# Patient Record
Sex: MALE | Race: WHITE | Employment: OTHER | URBAN - METROPOLITAN AREA
[De-identification: names, ages, dates, MRNs, and addresses within clinical notes are randomized per-mention and may not be internally consistent; named-entity substitution may affect disease eponyms.]

---

## 2017-02-06 ENCOUNTER — OFFICE VISIT (OUTPATIENT)
Dept: INTERNAL MEDICINE CLINIC | Age: 62
End: 2017-02-06

## 2017-02-06 VITALS
HEART RATE: 82 BPM | BODY MASS INDEX: 25.84 KG/M2 | DIASTOLIC BLOOD PRESSURE: 74 MMHG | SYSTOLIC BLOOD PRESSURE: 130 MMHG | RESPIRATION RATE: 16 BRPM | WEIGHT: 165 LBS

## 2017-02-06 DIAGNOSIS — R05.9 COUGH: Primary | ICD-10-CM

## 2017-02-06 PROCEDURE — 99213 OFFICE O/P EST LOW 20 MIN: CPT | Performed by: INTERNAL MEDICINE

## 2017-02-06 RX ORDER — PREDNISONE 10 MG/1
TABLET ORAL
Qty: 20 TABLET | Refills: 0 | Status: SHIPPED | OUTPATIENT
Start: 2017-02-06 | End: 2017-02-16

## 2017-06-05 RX ORDER — FOLIC ACID 1 MG/1
TABLET ORAL
Qty: 90 TABLET | Refills: 2 | Status: SHIPPED | OUTPATIENT
Start: 2017-06-05 | End: 2018-06-19 | Stop reason: SDUPTHER

## 2018-06-19 RX ORDER — FOLIC ACID 1 MG/1
TABLET ORAL
Qty: 30 TABLET | Refills: 0 | Status: SHIPPED | OUTPATIENT
Start: 2018-06-19 | End: 2018-06-25 | Stop reason: SDUPTHER

## 2018-06-25 ENCOUNTER — OFFICE VISIT (OUTPATIENT)
Dept: INTERNAL MEDICINE CLINIC | Age: 63
End: 2018-06-25

## 2018-06-25 VITALS
SYSTOLIC BLOOD PRESSURE: 126 MMHG | BODY MASS INDEX: 25.9 KG/M2 | RESPIRATION RATE: 16 BRPM | WEIGHT: 165 LBS | DIASTOLIC BLOOD PRESSURE: 78 MMHG | OXYGEN SATURATION: 99 % | HEART RATE: 70 BPM | HEIGHT: 67 IN

## 2018-06-25 DIAGNOSIS — Z00.00 PREVENTATIVE HEALTH CARE: Primary | ICD-10-CM

## 2018-06-25 DIAGNOSIS — M1A.00X0 IDIOPATHIC CHRONIC GOUT WITHOUT TOPHUS, UNSPECIFIED SITE: ICD-10-CM

## 2018-06-25 DIAGNOSIS — D58.0 SPHEROCYTOSIS, HEREDITARY (HCC): ICD-10-CM

## 2018-06-25 PROCEDURE — 99396 PREV VISIT EST AGE 40-64: CPT | Performed by: INTERNAL MEDICINE

## 2018-06-25 RX ORDER — FOLIC ACID 1 MG/1
1 TABLET ORAL DAILY
Qty: 30 TABLET | Refills: 11 | Status: SHIPPED | OUTPATIENT
Start: 2018-06-25 | End: 2019-07-02 | Stop reason: SDUPTHER

## 2018-06-25 ASSESSMENT — PATIENT HEALTH QUESTIONNAIRE - PHQ9
SUM OF ALL RESPONSES TO PHQ9 QUESTIONS 1 & 2: 0
2. FEELING DOWN, DEPRESSED OR HOPELESS: 0
SUM OF ALL RESPONSES TO PHQ QUESTIONS 1-9: 0
1. LITTLE INTEREST OR PLEASURE IN DOING THINGS: 0

## 2019-02-21 RX ORDER — OSELTAMIVIR PHOSPHATE 75 MG/1
75 CAPSULE ORAL DAILY
Qty: 10 CAPSULE | Refills: 0 | Status: SHIPPED | OUTPATIENT
Start: 2019-02-21 | End: 2019-03-03

## 2019-07-02 DIAGNOSIS — D58.0 SPHEROCYTOSIS, HEREDITARY (HCC): ICD-10-CM

## 2019-07-03 RX ORDER — FOLIC ACID 1 MG/1
TABLET ORAL
Qty: 30 TABLET | Refills: 10 | Status: SHIPPED | OUTPATIENT
Start: 2019-07-03 | End: 2020-07-20

## 2019-08-13 ENCOUNTER — OFFICE VISIT (OUTPATIENT)
Dept: INTERNAL MEDICINE CLINIC | Age: 64
End: 2019-08-13
Payer: COMMERCIAL

## 2019-08-13 VITALS
OXYGEN SATURATION: 98 % | SYSTOLIC BLOOD PRESSURE: 118 MMHG | BODY MASS INDEX: 25.75 KG/M2 | RESPIRATION RATE: 14 BRPM | DIASTOLIC BLOOD PRESSURE: 74 MMHG | HEART RATE: 94 BPM | WEIGHT: 164.4 LBS

## 2019-08-13 DIAGNOSIS — J40 BRONCHITIS: Primary | ICD-10-CM

## 2019-08-13 PROCEDURE — 99213 OFFICE O/P EST LOW 20 MIN: CPT | Performed by: INTERNAL MEDICINE

## 2019-08-13 RX ORDER — PREDNISONE 10 MG/1
TABLET ORAL
Qty: 20 TABLET | Refills: 0 | Status: SHIPPED | OUTPATIENT
Start: 2019-08-13 | End: 2020-08-18

## 2019-08-13 RX ORDER — FLUTICASONE PROPIONATE 50 MCG
2 SPRAY, SUSPENSION (ML) NASAL DAILY
Qty: 1 BOTTLE | Refills: 0 | Status: SHIPPED | OUTPATIENT
Start: 2019-08-13 | End: 2020-08-18

## 2019-08-13 RX ORDER — ALLOPURINOL 300 MG/1
TABLET ORAL
COMMUNITY
Start: 2019-07-03

## 2019-08-13 RX ORDER — BENZONATATE 100 MG/1
100 CAPSULE ORAL 3 TIMES DAILY PRN
Qty: 30 CAPSULE | Refills: 0 | Status: SHIPPED | OUTPATIENT
Start: 2019-08-13 | End: 2019-08-20

## 2019-08-13 ASSESSMENT — PATIENT HEALTH QUESTIONNAIRE - PHQ9
1. LITTLE INTEREST OR PLEASURE IN DOING THINGS: 0
2. FEELING DOWN, DEPRESSED OR HOPELESS: 0
SUM OF ALL RESPONSES TO PHQ QUESTIONS 1-9: 0
SUM OF ALL RESPONSES TO PHQ9 QUESTIONS 1 & 2: 0
SUM OF ALL RESPONSES TO PHQ QUESTIONS 1-9: 0

## 2019-08-16 ENCOUNTER — TELEPHONE (OUTPATIENT)
Dept: INTERNAL MEDICINE CLINIC | Age: 64
End: 2019-08-16

## 2019-08-16 RX ORDER — AZITHROMYCIN 250 MG/1
250 TABLET, FILM COATED ORAL DAILY
Qty: 10 TABLET | Refills: 0 | Status: SHIPPED | OUTPATIENT
Start: 2019-08-16 | End: 2019-08-26

## 2019-08-16 NOTE — TELEPHONE ENCOUNTER
Patient called states his cough is no better. Would like to know if you could give him something to help. Please advise. Thank you!

## 2020-07-20 RX ORDER — FOLIC ACID 1 MG/1
TABLET ORAL
Qty: 30 TABLET | Refills: 9 | Status: SHIPPED | OUTPATIENT
Start: 2020-07-20 | End: 2021-07-23

## 2020-08-18 ENCOUNTER — OFFICE VISIT (OUTPATIENT)
Dept: INTERNAL MEDICINE CLINIC | Age: 65
End: 2020-08-18
Payer: COMMERCIAL

## 2020-08-18 VITALS
HEART RATE: 78 BPM | RESPIRATION RATE: 18 BRPM | WEIGHT: 159 LBS | BODY MASS INDEX: 24.9 KG/M2 | OXYGEN SATURATION: 98 % | DIASTOLIC BLOOD PRESSURE: 68 MMHG | SYSTOLIC BLOOD PRESSURE: 114 MMHG

## 2020-08-18 PROCEDURE — 99396 PREV VISIT EST AGE 40-64: CPT | Performed by: INTERNAL MEDICINE

## 2020-08-18 RX ORDER — ALLOPURINOL 300 MG/1
TABLET ORAL
Qty: 30 TABLET | Status: CANCELLED | OUTPATIENT
Start: 2020-08-18

## 2020-08-18 ASSESSMENT — PATIENT HEALTH QUESTIONNAIRE - PHQ9
2. FEELING DOWN, DEPRESSED OR HOPELESS: 0
SUM OF ALL RESPONSES TO PHQ QUESTIONS 1-9: 0
1. LITTLE INTEREST OR PLEASURE IN DOING THINGS: 0
SUM OF ALL RESPONSES TO PHQ9 QUESTIONS 1 & 2: 0
SUM OF ALL RESPONSES TO PHQ QUESTIONS 1-9: 0

## 2020-08-18 NOTE — PROGRESS NOTES
Gordon Damico  1955 08/18/20    SUBJECTIVE:      Over the last 5 months pt has noticed numbness in the left hand leslie when he wakes up. This was occurring 2-3 times weekly. He is not completely sure what part of the hand was numb. He denies any weakness. This does not wake the pt. He has no such symptoms during the day. Symptoms have been better the past month. He has been cycling for exercise every other day. He continues on folic acid for the spherocytosis. He continues on allopurinol for gout - this has been very beneficial.     He has been on meds for onychomycosis. The topical med is helping, but he may need diflucan or lamisil. OBJECTIVE:    /68   Pulse 78   Resp 18   Wt 159 lb (72.1 kg)   SpO2 98%   BMI 24.90 kg/m²     Physical Exam  Constitutional:       Appearance: He is well-developed. Eyes:      General: No scleral icterus. Conjunctiva/sclera: Conjunctivae normal.   Neck:      Musculoskeletal: Neck supple. Thyroid: No thyromegaly. Vascular: No JVD. Trachea: No tracheal deviation. Cardiovascular:      Rate and Rhythm: Normal rate and regular rhythm. Heart sounds: Normal heart sounds. Pulmonary:      Effort: Pulmonary effort is normal. No respiratory distress. Breath sounds: Normal breath sounds. Abdominal:      General: There is no distension. Palpations: Abdomen is soft. There is no mass. Tenderness: There is no abdominal tenderness. There is no guarding or rebound. Lymphadenopathy:      Cervical: No cervical adenopathy. Skin:     Nails: There is no clubbing. Neurological:      Mental Status: He is alert and oriented to person, place, and time. Comments: Nonfocal   Psychiatric:         Behavior: Behavior normal.         Judgment: Judgment normal.         ASSESSMENT:    1. Encounter for preventive care        PLAN:    Fabiola Desouza was seen today for hand numbness.     Diagnoses and all orders for this visit:    Encounter

## 2020-08-24 LAB
A/G RATIO: 2.6 (ref 1.1–2.2)
ALBUMIN SERPL-MCNC: 4.6 G/DL (ref 3.4–5)
ALP BLD-CCNC: 75 U/L (ref 40–129)
ALT SERPL-CCNC: 12 U/L (ref 10–40)
ANION GAP SERPL CALCULATED.3IONS-SCNC: 7 MMOL/L (ref 3–16)
AST SERPL-CCNC: 17 U/L (ref 15–37)
BASOPHILS ABSOLUTE: 0 K/UL (ref 0–0.2)
BASOPHILS RELATIVE PERCENT: 0.8 %
BILIRUB SERPL-MCNC: 1.2 MG/DL (ref 0–1)
BUN BLDV-MCNC: 13 MG/DL (ref 7–20)
CALCIUM SERPL-MCNC: 9.6 MG/DL (ref 8.3–10.6)
CHLORIDE BLD-SCNC: 109 MMOL/L (ref 99–110)
CHOLESTEROL, TOTAL: 153 MG/DL (ref 0–199)
CO2: 27 MMOL/L (ref 21–32)
CREAT SERPL-MCNC: 1 MG/DL (ref 0.8–1.3)
EOSINOPHILS ABSOLUTE: 0.1 K/UL (ref 0–0.6)
EOSINOPHILS RELATIVE PERCENT: 3.1 %
GFR AFRICAN AMERICAN: >60
GFR NON-AFRICAN AMERICAN: >60
GLOBULIN: 1.8 G/DL
GLUCOSE BLD-MCNC: 108 MG/DL (ref 70–99)
HCT VFR BLD CALC: 35.5 % (ref 40.5–52.5)
HDLC SERPL-MCNC: 43 MG/DL (ref 40–60)
HEMOGLOBIN: 12.6 G/DL (ref 13.5–17.5)
LDL CHOLESTEROL CALCULATED: 88 MG/DL
LYMPHOCYTES ABSOLUTE: 1.3 K/UL (ref 1–5.1)
LYMPHOCYTES RELATIVE PERCENT: 28.9 %
MCH RBC QN AUTO: 33.5 PG (ref 26–34)
MCHC RBC AUTO-ENTMCNC: 35.4 G/DL (ref 31–36)
MCV RBC AUTO: 94.7 FL (ref 80–100)
MONOCYTES ABSOLUTE: 0.3 K/UL (ref 0–1.3)
MONOCYTES RELATIVE PERCENT: 6.8 %
NEUTROPHILS ABSOLUTE: 2.6 K/UL (ref 1.7–7.7)
NEUTROPHILS RELATIVE PERCENT: 60.4 %
PDW BLD-RTO: 15.1 % (ref 12.4–15.4)
PLATELET # BLD: 167 K/UL (ref 135–450)
PMV BLD AUTO: 8.2 FL (ref 5–10.5)
POTASSIUM SERPL-SCNC: 5.5 MMOL/L (ref 3.5–5.1)
RBC # BLD: 3.75 M/UL (ref 4.2–5.9)
SODIUM BLD-SCNC: 143 MMOL/L (ref 136–145)
TOTAL PROTEIN: 6.4 G/DL (ref 6.4–8.2)
TRIGL SERPL-MCNC: 112 MG/DL (ref 0–150)
VLDLC SERPL CALC-MCNC: 22 MG/DL
WBC # BLD: 4.4 K/UL (ref 4–11)

## 2020-08-24 PROCEDURE — 36415 COLL VENOUS BLD VENIPUNCTURE: CPT | Performed by: INTERNAL MEDICINE

## 2020-08-25 ENCOUNTER — TELEPHONE (OUTPATIENT)
Dept: INTERNAL MEDICINE CLINIC | Age: 65
End: 2020-08-25

## 2020-09-22 ENCOUNTER — NURSE ONLY (OUTPATIENT)
Dept: INTERNAL MEDICINE CLINIC | Age: 65
End: 2020-09-22
Payer: COMMERCIAL

## 2020-09-22 PROCEDURE — 90471 IMMUNIZATION ADMIN: CPT | Performed by: NURSE PRACTITIONER

## 2020-09-22 PROCEDURE — 90682 RIV4 VACC RECOMBINANT DNA IM: CPT | Performed by: NURSE PRACTITIONER

## 2021-08-19 ENCOUNTER — OFFICE VISIT (OUTPATIENT)
Dept: INTERNAL MEDICINE CLINIC | Age: 66
End: 2021-08-19
Payer: COMMERCIAL

## 2021-08-19 VITALS
WEIGHT: 151 LBS | OXYGEN SATURATION: 99 % | DIASTOLIC BLOOD PRESSURE: 78 MMHG | RESPIRATION RATE: 18 BRPM | BODY MASS INDEX: 23.65 KG/M2 | SYSTOLIC BLOOD PRESSURE: 126 MMHG | HEART RATE: 67 BPM

## 2021-08-19 DIAGNOSIS — Z00.00 ENCOUNTER FOR PREVENTIVE CARE: Primary | ICD-10-CM

## 2021-08-19 DIAGNOSIS — M1A.00X0 IDIOPATHIC CHRONIC GOUT WITHOUT TOPHUS, UNSPECIFIED SITE: ICD-10-CM

## 2021-08-19 DIAGNOSIS — R73.01 IFG (IMPAIRED FASTING GLUCOSE): ICD-10-CM

## 2021-08-19 DIAGNOSIS — R93.1 ELEVATED CORONARY ARTERY CALCIUM SCORE: ICD-10-CM

## 2021-08-19 DIAGNOSIS — D58.0 SPHEROCYTOSIS, HEREDITARY (HCC): ICD-10-CM

## 2021-08-19 DIAGNOSIS — Z00.00 ENCOUNTER FOR PREVENTIVE CARE: ICD-10-CM

## 2021-08-19 LAB
A/G RATIO: 2.4 (ref 1.1–2.2)
ALBUMIN SERPL-MCNC: 4.8 G/DL (ref 3.4–5)
ALP BLD-CCNC: 80 U/L (ref 40–129)
ALT SERPL-CCNC: 10 U/L (ref 10–40)
ANION GAP SERPL CALCULATED.3IONS-SCNC: 10 MMOL/L (ref 3–16)
AST SERPL-CCNC: 19 U/L (ref 15–37)
BASOPHILS ABSOLUTE: 0 K/UL (ref 0–0.2)
BASOPHILS RELATIVE PERCENT: 0.5 %
BILIRUB SERPL-MCNC: 1.5 MG/DL (ref 0–1)
BUN BLDV-MCNC: 13 MG/DL (ref 7–20)
CALCIUM SERPL-MCNC: 10.1 MG/DL (ref 8.3–10.6)
CHLORIDE BLD-SCNC: 108 MMOL/L (ref 99–110)
CHOLESTEROL, TOTAL: 169 MG/DL (ref 0–199)
CO2: 27 MMOL/L (ref 21–32)
CREAT SERPL-MCNC: 0.9 MG/DL (ref 0.8–1.3)
EOSINOPHILS ABSOLUTE: 0.1 K/UL (ref 0–0.6)
EOSINOPHILS RELATIVE PERCENT: 3 %
GFR AFRICAN AMERICAN: >60
GFR NON-AFRICAN AMERICAN: >60
GLOBULIN: 2 G/DL
GLUCOSE BLD-MCNC: 95 MG/DL (ref 70–99)
HCT VFR BLD CALC: 35.4 % (ref 40.5–52.5)
HDLC SERPL-MCNC: 54 MG/DL (ref 40–60)
HEMOGLOBIN: 12.4 G/DL (ref 13.5–17.5)
LDL CHOLESTEROL CALCULATED: 100 MG/DL
LYMPHOCYTES ABSOLUTE: 1.1 K/UL (ref 1–5.1)
LYMPHOCYTES RELATIVE PERCENT: 27.4 %
MCH RBC QN AUTO: 33.2 PG (ref 26–34)
MCHC RBC AUTO-ENTMCNC: 34.9 G/DL (ref 31–36)
MCV RBC AUTO: 95.3 FL (ref 80–100)
MONOCYTES ABSOLUTE: 0.3 K/UL (ref 0–1.3)
MONOCYTES RELATIVE PERCENT: 6.7 %
NEUTROPHILS ABSOLUTE: 2.4 K/UL (ref 1.7–7.7)
NEUTROPHILS RELATIVE PERCENT: 62.4 %
PDW BLD-RTO: 15.5 % (ref 12.4–15.4)
PLATELET # BLD: 159 K/UL (ref 135–450)
PMV BLD AUTO: 8.2 FL (ref 5–10.5)
POTASSIUM SERPL-SCNC: 5.1 MMOL/L (ref 3.5–5.1)
RBC # BLD: 3.72 M/UL (ref 4.2–5.9)
SODIUM BLD-SCNC: 145 MMOL/L (ref 136–145)
TOTAL PROTEIN: 6.8 G/DL (ref 6.4–8.2)
TRIGL SERPL-MCNC: 75 MG/DL (ref 0–150)
VLDLC SERPL CALC-MCNC: 15 MG/DL
WBC # BLD: 3.9 K/UL (ref 4–11)

## 2021-08-19 PROCEDURE — 36415 COLL VENOUS BLD VENIPUNCTURE: CPT | Performed by: INTERNAL MEDICINE

## 2021-08-19 PROCEDURE — 99396 PREV VISIT EST AGE 40-64: CPT | Performed by: INTERNAL MEDICINE

## 2021-08-19 RX ORDER — ASPIRIN 81 MG/1
81 TABLET ORAL DAILY
Qty: 90 TABLET | Refills: 1
Start: 2021-08-19

## 2021-08-19 RX ORDER — FOLIC ACID 1 MG/1
TABLET ORAL
Qty: 30 TABLET | Refills: 0 | Status: SHIPPED | OUTPATIENT
Start: 2021-08-19 | End: 2021-10-19

## 2021-08-19 ASSESSMENT — PATIENT HEALTH QUESTIONNAIRE - PHQ9
SUM OF ALL RESPONSES TO PHQ QUESTIONS 1-9: 0
SUM OF ALL RESPONSES TO PHQ QUESTIONS 1-9: 0
2. FEELING DOWN, DEPRESSED OR HOPELESS: 0
SUM OF ALL RESPONSES TO PHQ9 QUESTIONS 1 & 2: 0
SUM OF ALL RESPONSES TO PHQ QUESTIONS 1-9: 0
1. LITTLE INTEREST OR PLEASURE IN DOING THINGS: 0

## 2021-08-19 NOTE — PROGRESS NOTES
Polina Andino  1955 08/19/21    SUBJECTIVE:    Pt with calcium scoring result of 66, moderate risk. He continues on folic acid for the spherocytosis. He continues on allopurinol for gout. He is getting regular exercise - biking up to 5 times weekly 18-28 miles. OBJECTIVE:    /78   Pulse 67   Resp 18   Wt 151 lb (68.5 kg)   SpO2 99%   BMI 23.65 kg/m²     Physical Exam  Constitutional:       Appearance: He is well-developed. Eyes:      General: No scleral icterus. Conjunctiva/sclera: Conjunctivae normal.   Neck:      Thyroid: No thyromegaly. Vascular: No JVD. Trachea: No tracheal deviation. Cardiovascular:      Rate and Rhythm: Normal rate and regular rhythm. Heart sounds: Normal heart sounds. Pulmonary:      Effort: Pulmonary effort is normal. No respiratory distress. Breath sounds: Normal breath sounds. Abdominal:      General: There is no distension. Palpations: Abdomen is soft. There is no mass. Tenderness: There is no abdominal tenderness. There is no guarding or rebound. Musculoskeletal:      Cervical back: Neck supple. Lymphadenopathy:      Cervical: No cervical adenopathy. Skin:     Nails: There is no clubbing. Neurological:      Mental Status: He is alert and oriented to person, place, and time. Comments: Nonfocal   Psychiatric:         Behavior: Behavior normal.         Judgment: Judgment normal.         ASSESSMENT:    1. Encounter for preventive care    2. Spherocytosis, hereditary ([facility] [address]    3. Idiopathic chronic gout without tophus, unspecified site    4. Elevated coronary artery calcium score    5. IFG (impaired fasting glucose)        PLAN:    Eduardo Locke was seen today for medication refill.     Diagnoses and all orders for this visit:    Encounter for preventive care - check labs; cont with exercise; BP at goal; up to date on immunizations, except will tdap; up to date on c-scope  -     Comprehensive Metabolic Panel; Future  -     CBC Auto Differential; Future  -     Lipid Panel; Future    Spherocytosis, hereditary ([address]- cont folic acid, check labs  -     folic acid (FOLVITE) 1 MG tablet; TAKE ONE TABLET BY MOUTH DAILY    Idiopathic chronic gout without tophus, unspecified site - doing well with allopurinol    Elevated coronary artery calcium score - discussed ramifications; start ASA, watch chol closely  -     aspirin EC 81 MG EC tablet; Take 1 tablet by mouth daily  -     Comprehensive Metabolic Panel; Future  -     CBC Auto Differential; Future  -     Lipid Panel;  Future    IFG (impaired fasting glucose) - check a1c  -     Hemoglobin A1C; Future

## 2021-08-20 LAB
ESTIMATED AVERAGE GLUCOSE: 65.2 MG/DL
HBA1C MFR BLD: 3.9 %

## 2021-12-29 ENCOUNTER — VIRTUAL VISIT (OUTPATIENT)
Dept: INTERNAL MEDICINE CLINIC | Age: 66
End: 2021-12-29
Payer: COMMERCIAL

## 2021-12-29 DIAGNOSIS — J06.9 VIRAL UPPER RESPIRATORY TRACT INFECTION: Primary | ICD-10-CM

## 2021-12-29 PROCEDURE — 99213 OFFICE O/P EST LOW 20 MIN: CPT | Performed by: INTERNAL MEDICINE

## 2021-12-29 RX ORDER — AZITHROMYCIN 250 MG/1
250 TABLET, FILM COATED ORAL SEE ADMIN INSTRUCTIONS
Qty: 6 TABLET | Refills: 0 | Status: SHIPPED | OUTPATIENT
Start: 2021-12-29 | End: 2022-01-03

## 2021-12-29 RX ORDER — BENZONATATE 100 MG/1
100 CAPSULE ORAL 3 TIMES DAILY PRN
Qty: 30 CAPSULE | Refills: 0 | Status: SHIPPED | OUTPATIENT
Start: 2021-12-29 | End: 2022-01-05

## 2021-12-29 NOTE — PROGRESS NOTES
2021    TELEHEALTH EVALUATION -- Audio/Visual (During LGCAM-18 public health emergency)    HPI:    Kalina Tariq (:  1955) has requested an audio/video evaluation for the following concern(s):    Pt complains that 2 weeks ago he developed a sore throat, hoarse voice. He developed a cough, so he was tested for covid, was (-). The symptoms have persisted - cough occasionally productive of green mucous, rhinorrhea with green mucous. He denies ear pains, sinus pain, SOB, wheezing, fatigue, F/C, N/V, diarrhea, loss of taste or smell. Review of Systems    Prior to Visit Medications    Medication Sig Taking? Authorizing Provider   benzonatate (TESSALON) 100 MG capsule Take 1 capsule by mouth 3 times daily as needed for Cough Yes Chico Hung MD   azithromycin (ZITHROMAX) 250 MG tablet Take 1 tablet by mouth See Admin Instructions for 5 days 500mg on day 1 followed by 250mg on days 2 - 5 Yes Chico Hung MD   folic acid (FOLVITE) 1 MG tablet TAKE ONE TABLET BY MOUTH DAILY Yes Chico Hung MD   aspirin EC 81 MG EC tablet Take 1 tablet by mouth daily Yes Chico Hung MD   allopurinol (ZYLOPRIM) 300 MG tablet  Yes Historical Provider, MD       Social History     Tobacco Use    Smoking status: Former Smoker     Packs/day: 0.50     Quit date: 1995     Years since quittin.0    Smokeless tobacco: Never Used   Substance Use Topics    Alcohol use: Yes     Alcohol/week: 12.0 standard drinks     Types: 12 Cans of beer per week     Comment: 2 to 3 beers 3 to 4 Xs per week    Drug use: No          PHYSICAL EXAMINATION:    Constitutional: [x] Appears well-developed and well-nourished [x] No apparent distress      [] Abnormal-   Mental status  [x] Alert and awake  [x] Oriented to person/place/time [x]Able to follow commands             Psychiatric:       [x] Normal Affect [x] No Hallucinations      ASSESSMENT/PLAN:  1.  Viral upper respiratory tract infection - likely viral, treat with flonase, zyrtec, sinus rinse, tessalon. azith if not improving over the next few days  - benzonatate (TESSALON) 100 MG capsule; Take 1 capsule by mouth 3 times daily as needed for Cough  Dispense: 30 capsule; Refill: 0  - azithromycin (ZITHROMAX) 250 MG tablet; Take 1 tablet by mouth See Admin Instructions for 5 days 500mg on day 1 followed by 250mg on days 2 - 5  Dispense: 6 tablet; Refill: 0      No follow-ups on file. Alex Barrett, was evaluated through a synchronous (real-time) audio-video encounter. The patient (or guardian if applicable) is aware that this is a billable service. Verbal consent to proceed has been obtained within the past 12 months. The visit was conducted pursuant to the emergency declaration under the [address] [place] [address], [address] [place] [address] authority and the [employer] Act. Patient identification was verified, and a caregiver was present when appropriate. The patient was located in a state where the provider was credentialed to provide care. Total time spent on this encounter: Not billed by time    --Elan Heller MD on 12/29/2021 at 2:17 PM    An electronic signature was used to authenticate this note.

## 2022-01-26 ENCOUNTER — HOSPITAL ENCOUNTER (OUTPATIENT)
Age: 67
Setting detail: SPECIMEN
Discharge: HOME | End: 2022-01-26
Payer: COMMERCIAL

## 2022-01-26 ENCOUNTER — OFFICE VISIT (OUTPATIENT)
Dept: FAMILY MEDICINE CLINIC | Age: 67
End: 2022-01-26
Payer: COMMERCIAL

## 2022-01-26 VITALS
HEIGHT: 67 IN | HEART RATE: 78 BPM | TEMPERATURE: 97.4 F | OXYGEN SATURATION: 100 % | WEIGHT: 151 LBS | BODY MASS INDEX: 23.7 KG/M2

## 2022-01-26 DIAGNOSIS — R09.89 RUNNY NOSE: ICD-10-CM

## 2022-01-26 DIAGNOSIS — R05.9 COUGH: Primary | ICD-10-CM

## 2022-01-26 LAB
SARS-COV-2: DETECTED
SOURCE: ABNORMAL

## 2022-01-26 PROCEDURE — U0005 INFEC AGEN DETEC AMPLI PROBE: HCPCS

## 2022-01-26 PROCEDURE — 99213 OFFICE O/P EST LOW 20 MIN: CPT | Performed by: NURSE PRACTITIONER

## 2022-01-26 PROCEDURE — U0003 INFECTIOUS AGENT DETECTION BY NUCLEIC ACID (DNA OR RNA); SEVERE ACUTE RESPIRATORY SYNDROME CORONAVIRUS 2 (SARS-COV-2) (CORONAVIRUS DISEASE [COVID-19]), AMPLIFIED PROBE TECHNIQUE, MAKING USE OF HIGH THROUGHPUT TECHNOLOGIES AS DESCRIBED BY CMS-2020-01-R: HCPCS

## 2022-01-26 NOTE — PROGRESS NOTES
2022    HPI:  Chief complaint and history of present illness as per medical assistant/nurse documented today in the Flu/COVID-19 clinic. Patient is here with complaints of cough and runny nose x 1 day. Patient states he took an at home covid test today and it was positive. Patient states he has had his booster covid vaccine. MEDICATIONS:  Prior to Visit Medications    Medication Sig Taking? Authorizing Provider   folic acid (FOLVITE) 1 MG tablet TAKE ONE TABLET BY MOUTH DAILY  Kristie Lazo MD   aspirin EC 81 MG EC tablet Take 1 tablet by mouth daily  Kristie Lazo MD   allopurinol (ZYLOPRIM) 300 MG tablet   Historical Provider, MD       No Known Allergies,   Past Medical History:   Diagnosis Date    Colon polyps     Family history of coronary artery disease     Gout     Palpitations      TTE - mod TR,  EF 55%;  holter WNL    Spherocytosis, hereditary ([facility] [address]    ,   Past Surgical History:   Procedure Laterality Date    APPENDECTOMY      COLONOSCOPY      Dr Car Dougherty - screening and family history    COLONOSCOPY  14    Dr Salazar Sample      capsule endoscopy- Dr Ahsan Villatoro      basal cell ca chest    TONSILLECTOMY  1966    UPPER GASTROINTESTINAL ENDOSCOPY      Dr Car Dougherty   ,   Social History     Tobacco Use    Smoking status: Former Smoker     Packs/day: 0.50     Quit date: 1995     Years since quittin.0    Smokeless tobacco: Never Used   Substance Use Topics    Alcohol use:  Yes     Alcohol/week: 12.0 standard drinks     Types: 12 Cans of beer per week     Comment: 2 to 3 beers 3 to 4 Xs per week    Drug use: No   ,   Family History   Problem Relation Age of Onset    Cancer Mother         pancreas    Other Father         Lewy body disease    Cancer Sister         Breast cancer   ,   Immunization History   Administered Date(s) Administered    COVID-19, Pfizer Purple top, DILUTE for use, 12+ yrs, 30mcg/0.3mL dose 03/04/2021, 03/25/2021, 10/15/2021    Influenza, Oz, Recombinant, IM PF (Flublok 18 yrs and older) 09/22/2020    Tdap (Boostrix, Adacel) 01/26/2011   ,   Health Maintenance   Topic Date Due    DTaP/Tdap/Td vaccine (2 - Td or Tdap) 01/26/2021    Flu vaccine (1) 09/01/2021    Colon cancer screen colonoscopy  04/19/2022    Depression Screen  08/19/2022    Lipid screen  08/19/2026    Shingles Vaccine  Completed    COVID-19 Vaccine  Completed    Hepatitis C screen  Completed    HIV screen  Completed    Hepatitis A vaccine  Aged Out    Hepatitis B vaccine  Aged Out    Hib vaccine  Aged Out    Meningococcal (ACWY) vaccine  Aged Out    Pneumococcal 0-64 years Vaccine  Aged Out       PHYSICAL EXAM:  Physical Exam  Constitutional:       Appearance: Normal appearance. HENT:      Head: Normocephalic. Right Ear: Tympanic membrane, ear canal and external ear normal.      Left Ear: Tympanic membrane, ear canal and external ear normal.      Nose: Nose normal.      Mouth/Throat:      Lips: Pink. Mouth: Mucous membranes are moist.      Pharynx: Oropharynx is clear. Cardiovascular:      Rate and Rhythm: Normal rate and regular rhythm. Heart sounds: Normal heart sounds. Pulmonary:      Effort: Pulmonary effort is normal.      Breath sounds: Normal breath sounds. Musculoskeletal:      Cervical back: Neck supple. Skin:     General: Skin is warm and dry. Neurological:      Mental Status: He is alert and oriented to person, place, and time. Psychiatric:         Mood and Affect: Mood normal.         Behavior: Behavior normal.         ASSESSMENT/PLAN:  1. Cough  Your COVID 19 test can take 1-5 days for the results to come back. We ask that you make a Mychart page and view your test results this way. You will need to Self quarantine until you know your results. If positive, please work on contact tracing.     Increase fluids and rest  Saline nasal spray as needed for nasal congestion  Warm salt gargles as needed for throat discomfort  Monitor temperature twice a day  Tylenol as needed for fevers and/or discomfort. Big deep breaths periodically throughout the day  Regular Mucinex over the counter as needed for chest congestion  If symptoms worsen -Go to the ER. Follow up with your primary care provider  - Covid-19 Ambulatory    2. Runny nose  - Covid-19 Ambulatory      FOLLOW-UP:  Return if symptoms worsen or fail to improve.     In addition to other information, the printed after visit summary provided to the patient includes:  [x] COVID-19 Self care instructions  [x] COVID-19 General patient information

## 2022-01-26 NOTE — PATIENT INSTRUCTIONS
Your COVID 19 test can take 1-5 days for the results to come back. We ask that you make a Mychart page and view your test results this way. You will need to Self quarantine until you know your results. If positive, please work on contact tracing. Increase fluids and rest  Saline nasal spray as needed for nasal congestion  Warm salt gargles as needed for throat discomfort  Monitor temperature twice a day  Tylenol as needed for fevers and/or discomfort. Big deep breaths periodically throughout the day  Regular Mucinex over the counter as needed for chest congestion  If symptoms worsen -Go to the ER. Follow up with your primary care provider      To Whom it May Concern:    Jory Lopez was tested for COVID-19 1/26/2022. He/she must stay home until test results are back. If test is positive, isolate for a total of 5 days, starting from day 1 of symptom onset. After 5 days, if fever-free for 24 hours and there has been a gradual improvement in symptoms, may come out of isolation, but must consistently wear a mask when around other people for 5 additional days. (5 days isolation, 5 days mask-wearing). We do not recommend retesting as patients may continue to test positive for months even though no longer contagious. It is suggested you call [phone] or [address] with any questions regarding isolation timeframe/return to work/school details.

## 2022-01-26 NOTE — PROGRESS NOTES
1/26/22  Crespo Resides  1955    FLU/COVID-19 CLINIC EVALUATION    HPI SYMPTOMS:    Employer: Retired    [] Fevers  [] Chills  [x] Cough  [] Coughing up blood  [] Chest Congestion  [] Nasal Congestion  [] Feeling short of breath  [] Sometimes  [] Frequently  [] All the time  [] Chest pain  [] Headaches  []Tolerable  [] Severe  [] Sore throat  [] Muscle aches  [] Nausea  [] Vomiting  []Unable to keep fluids down  [] Diarrhea  []Severe    [x] OTHER SYMPTOMS:      Symptom Duration:   [x] 1  [] 2   [] 3   [] 4    [] 5   [] 6   [] 7   [] 8   [] 9   [] 10   [] 11   [] 12   [] 13   [] 14   [] Longer than 14 days    Symptom course:   [x] Worsening     [] Stable     [] Improving    RISK FACTORS:    [] Pregnant or possibly pregnant  [x] Age over 61  [] Diabetes  [] Heart disease  [] Asthma  [] COPD/Other chronic lung diseases  [] Active Cancer  [] On Chemotherapy  [] Taking oral steroids  [] History Lymphoma/Leukemia  [] Close contact with a lab confirmed COVID-19 patient within 14 days of symptom onset  [] History of travel from affected geographical areas within 14 days of symptom onset       VITALS:  There were no vitals filed for this visit. TESTS:    POCT FLU:  [] Positive     []Negative    ASSESSMENT:    [] Flu  [] Possible COVID-19  [] Strep    PLAN:    [] Discharge home with written instructions for:  [] Flu management  [] Possible COVID-19 infection with self-quarantine and management of symptoms  [] Follow-up with primary care physician or emergency department if worsens  [] Evaluation per physician/NP/PA in clinic  [] Sent to ER       An  electronic signature was used to authenticate this note.      --Jordyn Hickman LPN on 0/30/7684 at 6:10 AM

## 2022-08-22 ENCOUNTER — OFFICE VISIT (OUTPATIENT)
Dept: INTERNAL MEDICINE CLINIC | Age: 67
End: 2022-08-22
Payer: MEDICARE

## 2022-08-22 VITALS
RESPIRATION RATE: 14 BRPM | OXYGEN SATURATION: 100 % | SYSTOLIC BLOOD PRESSURE: 128 MMHG | BODY MASS INDEX: 23.68 KG/M2 | DIASTOLIC BLOOD PRESSURE: 68 MMHG | HEART RATE: 70 BPM | WEIGHT: 151.2 LBS

## 2022-08-22 DIAGNOSIS — E78.00 HYPERCHOLESTEROLEMIA: ICD-10-CM

## 2022-08-22 DIAGNOSIS — D58.0 SPHEROCYTOSIS, HEREDITARY (HCC): Primary | ICD-10-CM

## 2022-08-22 DIAGNOSIS — R73.01 IFG (IMPAIRED FASTING GLUCOSE): ICD-10-CM

## 2022-08-22 DIAGNOSIS — M1A.00X0 IDIOPATHIC CHRONIC GOUT WITHOUT TOPHUS, UNSPECIFIED SITE: ICD-10-CM

## 2022-08-22 DIAGNOSIS — D58.0 SPHEROCYTOSIS, HEREDITARY (HCC): ICD-10-CM

## 2022-08-22 DIAGNOSIS — Z12.11 COLON CANCER SCREENING: ICD-10-CM

## 2022-08-22 DIAGNOSIS — Z13.6 ENCOUNTER FOR ABDOMINAL AORTIC ANEURYSM (AAA) SCREENING: ICD-10-CM

## 2022-08-22 DIAGNOSIS — Z23 NEED FOR VACCINATION FOR PNEUMOCOCCUS: ICD-10-CM

## 2022-08-22 LAB
A/G RATIO: 2.7 (ref 1.1–2.2)
ALBUMIN SERPL-MCNC: 4.8 G/DL (ref 3.4–5)
ALP BLD-CCNC: 73 U/L (ref 40–129)
ALT SERPL-CCNC: 10 U/L (ref 10–40)
ANION GAP SERPL CALCULATED.3IONS-SCNC: 13 MMOL/L (ref 3–16)
AST SERPL-CCNC: 19 U/L (ref 15–37)
BASOPHILS ABSOLUTE: 0 K/UL (ref 0–0.2)
BASOPHILS RELATIVE PERCENT: 0.6 %
BILIRUB SERPL-MCNC: 1.8 MG/DL (ref 0–1)
BUN BLDV-MCNC: 19 MG/DL (ref 7–20)
CALCIUM SERPL-MCNC: 9.2 MG/DL (ref 8.3–10.6)
CHLORIDE BLD-SCNC: 108 MMOL/L (ref 99–110)
CHOLESTEROL, TOTAL: 204 MG/DL (ref 0–199)
CO2: 23 MMOL/L (ref 21–32)
CREAT SERPL-MCNC: 0.8 MG/DL (ref 0.8–1.3)
EOSINOPHILS ABSOLUTE: 0.1 K/UL (ref 0–0.6)
EOSINOPHILS RELATIVE PERCENT: 1.8 %
GFR AFRICAN AMERICAN: >60
GFR NON-AFRICAN AMERICAN: >60
GLUCOSE BLD-MCNC: 85 MG/DL (ref 70–99)
HCT VFR BLD CALC: 34.5 % (ref 40.5–52.5)
HDLC SERPL-MCNC: 64 MG/DL (ref 40–60)
HEMOGLOBIN: 12.3 G/DL (ref 13.5–17.5)
LDL CHOLESTEROL CALCULATED: 131 MG/DL
LYMPHOCYTES ABSOLUTE: 1.1 K/UL (ref 1–5.1)
LYMPHOCYTES RELATIVE PERCENT: 27.9 %
MCH RBC QN AUTO: 34.2 PG (ref 26–34)
MCHC RBC AUTO-ENTMCNC: 35.7 G/DL (ref 31–36)
MCV RBC AUTO: 96 FL (ref 80–100)
MONOCYTES ABSOLUTE: 0.3 K/UL (ref 0–1.3)
MONOCYTES RELATIVE PERCENT: 7.1 %
NEUTROPHILS ABSOLUTE: 2.5 K/UL (ref 1.7–7.7)
NEUTROPHILS RELATIVE PERCENT: 62.6 %
PDW BLD-RTO: 15.2 % (ref 12.4–15.4)
PLATELET # BLD: 144 K/UL (ref 135–450)
PMV BLD AUTO: 7.8 FL (ref 5–10.5)
POTASSIUM SERPL-SCNC: 5 MMOL/L (ref 3.5–5.1)
RBC # BLD: 3.59 M/UL (ref 4.2–5.9)
SODIUM BLD-SCNC: 144 MMOL/L (ref 136–145)
TOTAL PROTEIN: 6.6 G/DL (ref 6.4–8.2)
TRIGL SERPL-MCNC: 45 MG/DL (ref 0–150)
VLDLC SERPL CALC-MCNC: 9 MG/DL
WBC # BLD: 4.1 K/UL (ref 4–11)

## 2022-08-22 PROCEDURE — 36415 COLL VENOUS BLD VENIPUNCTURE: CPT | Performed by: INTERNAL MEDICINE

## 2022-08-22 PROCEDURE — 99214 OFFICE O/P EST MOD 30 MIN: CPT | Performed by: INTERNAL MEDICINE

## 2022-08-22 PROCEDURE — 1123F ACP DISCUSS/DSCN MKR DOCD: CPT | Performed by: INTERNAL MEDICINE

## 2022-08-22 PROCEDURE — 90471 IMMUNIZATION ADMIN: CPT | Performed by: INTERNAL MEDICINE

## 2022-08-22 PROCEDURE — 90677 PCV20 VACCINE IM: CPT | Performed by: INTERNAL MEDICINE

## 2022-08-22 SDOH — ECONOMIC STABILITY: FOOD INSECURITY: WITHIN THE PAST 12 MONTHS, THE FOOD YOU BOUGHT JUST DIDN'T LAST AND YOU DIDN'T HAVE MONEY TO GET MORE.: NEVER TRUE

## 2022-08-22 SDOH — ECONOMIC STABILITY: FOOD INSECURITY: WITHIN THE PAST 12 MONTHS, YOU WORRIED THAT YOUR FOOD WOULD RUN OUT BEFORE YOU GOT MONEY TO BUY MORE.: NEVER TRUE

## 2022-08-22 ASSESSMENT — PATIENT HEALTH QUESTIONNAIRE - PHQ9
SUM OF ALL RESPONSES TO PHQ9 QUESTIONS 1 & 2: 0
SUM OF ALL RESPONSES TO PHQ QUESTIONS 1-9: 0
1. LITTLE INTEREST OR PLEASURE IN DOING THINGS: 0
2. FEELING DOWN, DEPRESSED OR HOPELESS: 0

## 2022-08-22 ASSESSMENT — SOCIAL DETERMINANTS OF HEALTH (SDOH): HOW HARD IS IT FOR YOU TO PAY FOR THE VERY BASICS LIKE FOOD, HOUSING, MEDICAL CARE, AND HEATING?: NOT HARD AT ALL

## 2022-08-22 NOTE — PROGRESS NOTES
Britney Vail  1955 08/22/22    SUBJECTIVE:    Pt continues on allopurinol, has not had any episodes of gout. He continues on folic acid for spherocytosis. Pt is exercising 3-4 times weekly. OBJECTIVE:    /68   Pulse 70   Resp 14   Wt 151 lb 3.2 oz (68.6 kg)   SpO2 100%   BMI 23.68 kg/m²     Physical Exam  Constitutional:       Appearance: He is well-developed. Eyes:      General: No scleral icterus. Conjunctiva/sclera: Conjunctivae normal.   Neck:      Thyroid: No thyromegaly. Vascular: No JVD. Trachea: No tracheal deviation. Cardiovascular:      Rate and Rhythm: Normal rate and regular rhythm. Heart sounds: Normal heart sounds. Pulmonary:      Effort: Pulmonary effort is normal. No respiratory distress. Breath sounds: Normal breath sounds. Abdominal:      General: There is no distension. Palpations: Abdomen is soft. There is no mass. Tenderness: There is no abdominal tenderness. There is no guarding or rebound. Musculoskeletal:      Cervical back: Neck supple. Lymphadenopathy:      Cervical: No cervical adenopathy. Skin:     Nails: There is no clubbing. Neurological:      Mental Status: He is alert and oriented to person, place, and time. Comments: Nonfocal   Psychiatric:         Behavior: Behavior normal.         Judgment: Judgment normal.       ASSESSMENT:    1. Spherocytosis, hereditary ([address]    2. Idiopathic chronic gout without tophus, unspecified site    3. Colon cancer screening    4. Need for vaccination for pneumococcus    5. Encounter for abdominal aortic aneurysm (AAA) screening    6. IFG (impaired fasting glucose)    7.  Hypercholesterolemia        PLAN:    Landry Giron was seen today for annual exam.    Diagnoses and all orders for this visit:    Spherocytosis, hereditary ([address] - check labs, cont folic acid  -     CBC with Auto Differential; Future    Idiopathic chronic gout without tophus, unspecified site - cont allopurinol    Colon cancer screening - await c-scope    Need for vaccination for pneumococcus  -     Pneumococcal, PCV20, PREVNAR 21, (age 25 yrs+), IM, PF    Encounter for abdominal aortic aneurysm (AAA) screening - check US  -     US SCREENING FOR AAA; Future    IFG (impaired fasting glucose) - check labs  -     Hemoglobin A1C; Future    Hypercholesterolemia - check labs, encourage exercise  -     Comprehensive Metabolic Panel; Future  -     Lipid Panel;  Future

## 2022-08-23 LAB
ESTIMATED AVERAGE GLUCOSE: 68.1 MG/DL
HBA1C MFR BLD: 4 %

## 2022-08-26 ENCOUNTER — HOSPITAL ENCOUNTER (OUTPATIENT)
Dept: ULTRASOUND IMAGING | Age: 67
Discharge: HOME | End: 2022-08-26
Payer: MEDICARE

## 2022-08-26 DIAGNOSIS — Z13.6 ENCOUNTER FOR ABDOMINAL AORTIC ANEURYSM (AAA) SCREENING: ICD-10-CM

## 2022-08-26 PROCEDURE — 93978 VASCULAR STUDY: CPT

## 2022-10-25 DIAGNOSIS — D58.0 SPHEROCYTOSIS, HEREDITARY (HCC): ICD-10-CM

## 2022-10-26 RX ORDER — FOLIC ACID 1 MG/1
TABLET ORAL
Qty: 30 TABLET | Refills: 11 | Status: SHIPPED | OUTPATIENT
Start: 2022-10-26

## 2023-04-24 ENCOUNTER — OFFICE VISIT (OUTPATIENT)
Dept: INTERNAL MEDICINE CLINIC | Age: 68
End: 2023-04-24
Payer: MEDICARE

## 2023-04-24 VITALS
RESPIRATION RATE: 18 BRPM | WEIGHT: 159 LBS | BODY MASS INDEX: 24.9 KG/M2 | HEART RATE: 75 BPM | OXYGEN SATURATION: 100 % | SYSTOLIC BLOOD PRESSURE: 132 MMHG | DIASTOLIC BLOOD PRESSURE: 78 MMHG

## 2023-04-24 DIAGNOSIS — Z12.5 PROSTATE CANCER SCREENING: ICD-10-CM

## 2023-04-24 DIAGNOSIS — D58.0 SPHEROCYTOSIS, HEREDITARY (HCC): Primary | ICD-10-CM

## 2023-04-24 LAB — PSA SERPL DL<=0.01 NG/ML-MCNC: 1.93 NG/ML (ref 0–4)

## 2023-04-24 PROCEDURE — 36415 COLL VENOUS BLD VENIPUNCTURE: CPT | Performed by: INTERNAL MEDICINE

## 2023-04-24 PROCEDURE — 99213 OFFICE O/P EST LOW 20 MIN: CPT | Performed by: INTERNAL MEDICINE

## 2023-04-24 PROCEDURE — 1123F ACP DISCUSS/DSCN MKR DOCD: CPT | Performed by: INTERNAL MEDICINE

## 2023-04-24 RX ORDER — FOLIC ACID 1 MG/1
1000 TABLET ORAL DAILY
Qty: 30 TABLET | Refills: 11 | Status: SHIPPED | OUTPATIENT
Start: 2023-04-24

## 2023-04-24 SDOH — ECONOMIC STABILITY: HOUSING INSECURITY
IN THE LAST 12 MONTHS, WAS THERE A TIME WHEN YOU DID NOT HAVE A STEADY PLACE TO SLEEP OR SLEPT IN A SHELTER (INCLUDING NOW)?: NO

## 2023-04-24 SDOH — ECONOMIC STABILITY: INCOME INSECURITY: HOW HARD IS IT FOR YOU TO PAY FOR THE VERY BASICS LIKE FOOD, HOUSING, MEDICAL CARE, AND HEATING?: NOT HARD AT ALL

## 2023-04-24 SDOH — ECONOMIC STABILITY: FOOD INSECURITY: WITHIN THE PAST 12 MONTHS, THE FOOD YOU BOUGHT JUST DIDN'T LAST AND YOU DIDN'T HAVE MONEY TO GET MORE.: NEVER TRUE

## 2023-04-24 SDOH — ECONOMIC STABILITY: FOOD INSECURITY: WITHIN THE PAST 12 MONTHS, YOU WORRIED THAT YOUR FOOD WOULD RUN OUT BEFORE YOU GOT MONEY TO BUY MORE.: NEVER TRUE

## 2023-04-24 ASSESSMENT — PATIENT HEALTH QUESTIONNAIRE - PHQ9
SUM OF ALL RESPONSES TO PHQ9 QUESTIONS 1 & 2: 0
2. FEELING DOWN, DEPRESSED OR HOPELESS: 0
SUM OF ALL RESPONSES TO PHQ QUESTIONS 1-9: 0
SUM OF ALL RESPONSES TO PHQ QUESTIONS 1-9: 0
1. LITTLE INTEREST OR PLEASURE IN DOING THINGS: 0
SUM OF ALL RESPONSES TO PHQ QUESTIONS 1-9: 0
SUM OF ALL RESPONSES TO PHQ QUESTIONS 1-9: 0

## 2023-04-24 NOTE — PROGRESS NOTES
Tricia Lindsay  1955 04/24/23    SUBJECTIVE:      Pt is interested in having PSA checked. He continues on folic acid for the anemia    OBJECTIVE:    /78   Pulse 75   Resp 18   Wt 159 lb (72.1 kg)   SpO2 100%   BMI 24.90 kg/m²     Physical Exam    ASSESSMENT:    1. Spherocytosis, hereditary ([facility] [address]    2. Prostate cancer screening        PLAN:    Gabby Khanna was seen today for other and medication refill. Diagnoses and all orders for this visit:    Spherocytosis, hereditary ([facility] [address] - cont folic acid; discussed referral to heme pt declines for now  -     folic acid (FOLVITE) 1 MG tablet; Take 1 tablet by mouth daily    Prostate cancer screening - discussed at length; will check PSA  -     PSA Screening;  Future

## 2023-05-17 ENCOUNTER — OFFICE VISIT (OUTPATIENT)
Dept: INTERNAL MEDICINE CLINIC | Age: 68
End: 2023-05-17
Payer: MEDICARE

## 2023-05-17 VITALS
BODY MASS INDEX: 24.67 KG/M2 | DIASTOLIC BLOOD PRESSURE: 80 MMHG | HEART RATE: 73 BPM | OXYGEN SATURATION: 99 % | SYSTOLIC BLOOD PRESSURE: 132 MMHG | WEIGHT: 157.2 LBS | HEIGHT: 67 IN

## 2023-05-17 DIAGNOSIS — R05.1 ACUTE COUGH: Primary | ICD-10-CM

## 2023-05-17 PROCEDURE — 1123F ACP DISCUSS/DSCN MKR DOCD: CPT | Performed by: INTERNAL MEDICINE

## 2023-05-17 PROCEDURE — 99213 OFFICE O/P EST LOW 20 MIN: CPT | Performed by: INTERNAL MEDICINE

## 2023-05-17 RX ORDER — PREDNISONE 10 MG/1
TABLET ORAL
Qty: 20 TABLET | Refills: 0 | Status: SHIPPED | OUTPATIENT
Start: 2023-05-17

## 2023-05-17 NOTE — PROGRESS NOTES
Barbie Stephens  1955 05/17/23    SUBJECTIVE:    Pt complains of a cough for 12 days. Initially he had rhinorrhea that resolved, but he subsequently developed a cough. The cough does not occur when lying. Symptoms improved 4-5 days ago, but then recurred. Cough is dry. He has mild nasal congestion    He denies ear pain, sinus pain, rhinorrhea, PND, SOB, wheezing, F/C, heartburn, reflux. He tested (-) for covid    OBJECTIVE:    /80 (Site: Left Upper Arm, Position: Sitting, Cuff Size: Medium Adult)   Pulse 73   Ht 5' 7\" (1.702 m)   Wt 157 lb 3.2 oz (71.3 kg)   SpO2 99%   BMI 24.62 kg/m²     Physical Exam  Constitutional:       Appearance: He is well-developed. HENT:      Right Ear: External ear normal.      Left Ear: External ear normal.      Nose: Nose normal.      Mouth/Throat:      Pharynx: No oropharyngeal exudate. Eyes:      Conjunctiva/sclera: Conjunctivae normal.   Cardiovascular:      Rate and Rhythm: Normal rate and regular rhythm. Heart sounds: Normal heart sounds. Pulmonary:      Effort: Pulmonary effort is normal.      Breath sounds: Normal breath sounds. Lymphadenopathy:      Cervical: No cervical adenopathy. Neurological:      Mental Status: He is alert. ASSESSMENT:    1. Acute cough        PLAN:    Claritza Sandoval was seen today for congestion and cough. Diagnoses and all orders for this visit:    Acute cough - likely a post bronchitic cough; Tx with prednisone taper. No indication for abx  -     predniSONE (DELTASONE) 10 MG tablet;  Take 4 tablets daily for 2 days, then 3 tablets daily for 2 days, then two tablets daily for 2 days, then one tablet daily for 2 days

## 2023-08-28 ENCOUNTER — OFFICE VISIT (OUTPATIENT)
Dept: INTERNAL MEDICINE CLINIC | Age: 68
End: 2023-08-28
Payer: MEDICARE

## 2023-08-28 VITALS
BODY MASS INDEX: 24.81 KG/M2 | WEIGHT: 158.4 LBS | SYSTOLIC BLOOD PRESSURE: 122 MMHG | HEART RATE: 68 BPM | OXYGEN SATURATION: 100 % | DIASTOLIC BLOOD PRESSURE: 66 MMHG

## 2023-08-28 DIAGNOSIS — E78.00 HYPERCHOLESTEROLEMIA: ICD-10-CM

## 2023-08-28 DIAGNOSIS — M1A.00X0 IDIOPATHIC CHRONIC GOUT WITHOUT TOPHUS, UNSPECIFIED SITE: ICD-10-CM

## 2023-08-28 DIAGNOSIS — D58.0 SPHEROCYTOSIS, HEREDITARY (HCC): Primary | ICD-10-CM

## 2023-08-28 DIAGNOSIS — R73.01 IFG (IMPAIRED FASTING GLUCOSE): ICD-10-CM

## 2023-08-28 DIAGNOSIS — D58.0 SPHEROCYTOSIS, HEREDITARY (HCC): ICD-10-CM

## 2023-08-28 LAB
ALBUMIN SERPL-MCNC: 4.9 G/DL (ref 3.4–5)
ALBUMIN/GLOB SERPL: 2.9 {RATIO} (ref 1.1–2.2)
ALP SERPL-CCNC: 74 U/L (ref 40–129)
ALT SERPL-CCNC: 13 U/L (ref 10–40)
ANION GAP SERPL CALCULATED.3IONS-SCNC: 12 MMOL/L (ref 3–16)
AST SERPL-CCNC: 20 U/L (ref 15–37)
BASOPHILS # BLD: 0 K/UL (ref 0–0.2)
BASOPHILS NFR BLD: 0.6 %
BILIRUB SERPL-MCNC: 1.4 MG/DL (ref 0–1)
BUN SERPL-MCNC: 17 MG/DL (ref 7–20)
CALCIUM SERPL-MCNC: 9.3 MG/DL (ref 8.3–10.6)
CHLORIDE SERPL-SCNC: 108 MMOL/L (ref 99–110)
CHOLEST SERPL-MCNC: 170 MG/DL (ref 0–199)
CO2 SERPL-SCNC: 24 MMOL/L (ref 21–32)
CREAT SERPL-MCNC: 0.9 MG/DL (ref 0.8–1.3)
DEPRECATED RDW RBC AUTO: 15.6 % (ref 12.4–15.4)
EOSINOPHIL # BLD: 0.1 K/UL (ref 0–0.6)
EOSINOPHIL NFR BLD: 2.4 %
GFR SERPLBLD CREATININE-BSD FMLA CKD-EPI: >60 ML/MIN/{1.73_M2}
GLUCOSE SERPL-MCNC: 98 MG/DL (ref 70–99)
HCT VFR BLD AUTO: 35.6 % (ref 40.5–52.5)
HDLC SERPL-MCNC: 47 MG/DL (ref 40–60)
HGB BLD-MCNC: 12.6 G/DL (ref 13.5–17.5)
LDLC SERPL CALC-MCNC: 102 MG/DL
LYMPHOCYTES # BLD: 1.3 K/UL (ref 1–5.1)
LYMPHOCYTES NFR BLD: 29.8 %
MCH RBC QN AUTO: 33.7 PG (ref 26–34)
MCHC RBC AUTO-ENTMCNC: 35.4 G/DL (ref 31–36)
MCV RBC AUTO: 95.2 FL (ref 80–100)
MONOCYTES # BLD: 0.3 K/UL (ref 0–1.3)
MONOCYTES NFR BLD: 7.7 %
NEUTROPHILS # BLD: 2.7 K/UL (ref 1.7–7.7)
NEUTROPHILS NFR BLD: 59.5 %
PLATELET # BLD AUTO: 161 K/UL (ref 135–450)
PMV BLD AUTO: 8.3 FL (ref 5–10.5)
POTASSIUM SERPL-SCNC: 4.7 MMOL/L (ref 3.5–5.1)
PROT SERPL-MCNC: 6.6 G/DL (ref 6.4–8.2)
RBC # BLD AUTO: 3.74 M/UL (ref 4.2–5.9)
SODIUM SERPL-SCNC: 144 MMOL/L (ref 136–145)
TRIGL SERPL-MCNC: 107 MG/DL (ref 0–150)
VLDLC SERPL CALC-MCNC: 21 MG/DL
WBC # BLD AUTO: 4.5 K/UL (ref 4–11)

## 2023-08-28 PROCEDURE — 36415 COLL VENOUS BLD VENIPUNCTURE: CPT | Performed by: INTERNAL MEDICINE

## 2023-08-28 PROCEDURE — 99213 OFFICE O/P EST LOW 20 MIN: CPT | Performed by: INTERNAL MEDICINE

## 2023-08-28 PROCEDURE — 1123F ACP DISCUSS/DSCN MKR DOCD: CPT | Performed by: INTERNAL MEDICINE

## 2023-08-29 LAB
EST. AVERAGE GLUCOSE BLD GHB EST-MCNC: 68.1 MG/DL
HBA1C MFR BLD: 4 %

## 2024-05-09 DIAGNOSIS — D58.0 SPHEROCYTOSIS, HEREDITARY (HCC): ICD-10-CM

## 2024-05-10 RX ORDER — FOLIC ACID 1 MG/1
1000 TABLET ORAL DAILY
Qty: 30 TABLET | Refills: 11 | Status: SHIPPED | OUTPATIENT
Start: 2024-05-10

## 2024-05-23 SDOH — ECONOMIC STABILITY: TRANSPORTATION INSECURITY
IN THE PAST 12 MONTHS, HAS LACK OF TRANSPORTATION KEPT YOU FROM MEETINGS, WORK, OR FROM GETTING THINGS NEEDED FOR DAILY LIVING?: NO

## 2024-05-23 SDOH — ECONOMIC STABILITY: FOOD INSECURITY: WITHIN THE PAST 12 MONTHS, YOU WORRIED THAT YOUR FOOD WOULD RUN OUT BEFORE YOU GOT MONEY TO BUY MORE.: NEVER TRUE

## 2024-05-23 SDOH — ECONOMIC STABILITY: FOOD INSECURITY: WITHIN THE PAST 12 MONTHS, THE FOOD YOU BOUGHT JUST DIDN'T LAST AND YOU DIDN'T HAVE MONEY TO GET MORE.: NEVER TRUE

## 2024-05-23 SDOH — ECONOMIC STABILITY: INCOME INSECURITY: HOW HARD IS IT FOR YOU TO PAY FOR THE VERY BASICS LIKE FOOD, HOUSING, MEDICAL CARE, AND HEATING?: NOT HARD AT ALL

## 2024-05-23 ASSESSMENT — PATIENT HEALTH QUESTIONNAIRE - PHQ9
2. FEELING DOWN, DEPRESSED OR HOPELESS: NOT AT ALL
SUM OF ALL RESPONSES TO PHQ QUESTIONS 1-9: 0
SUM OF ALL RESPONSES TO PHQ QUESTIONS 1-9: 0
1. LITTLE INTEREST OR PLEASURE IN DOING THINGS: NOT AT ALL
SUM OF ALL RESPONSES TO PHQ QUESTIONS 1-9: 0
2. FEELING DOWN, DEPRESSED OR HOPELESS: NOT AT ALL
SUM OF ALL RESPONSES TO PHQ9 QUESTIONS 1 & 2: 0
SUM OF ALL RESPONSES TO PHQ QUESTIONS 1-9: 0
1. LITTLE INTEREST OR PLEASURE IN DOING THINGS: NOT AT ALL
SUM OF ALL RESPONSES TO PHQ9 QUESTIONS 1 & 2: 0

## 2024-05-24 ENCOUNTER — OFFICE VISIT (OUTPATIENT)
Dept: INTERNAL MEDICINE CLINIC | Age: 69
End: 2024-05-24

## 2024-05-24 VITALS
SYSTOLIC BLOOD PRESSURE: 130 MMHG | HEIGHT: 67 IN | HEART RATE: 69 BPM | WEIGHT: 158.5 LBS | DIASTOLIC BLOOD PRESSURE: 76 MMHG | OXYGEN SATURATION: 99 % | BODY MASS INDEX: 24.88 KG/M2

## 2024-05-24 DIAGNOSIS — R58 ECCHYMOSIS: Primary | ICD-10-CM

## 2024-05-24 DIAGNOSIS — R58 ECCHYMOSIS: ICD-10-CM

## 2024-05-24 LAB
DEPRECATED RDW RBC AUTO: 15.6 % (ref 12.4–15.4)
HCT VFR BLD AUTO: 36.5 % (ref 40.5–52.5)
HGB BLD-MCNC: 13.1 G/DL (ref 13.5–17.5)
MCH RBC QN AUTO: 33.4 PG (ref 26–34)
MCHC RBC AUTO-ENTMCNC: 35.9 G/DL (ref 31–36)
MCV RBC AUTO: 92.9 FL (ref 80–100)
PLATELET # BLD AUTO: 154 K/UL (ref 135–450)
PMV BLD AUTO: 8.2 FL (ref 5–10.5)
RBC # BLD AUTO: 3.93 M/UL (ref 4.2–5.9)
WBC # BLD AUTO: 4.8 K/UL (ref 4–11)

## 2024-05-24 PROCEDURE — 36415 COLL VENOUS BLD VENIPUNCTURE: CPT | Performed by: INTERNAL MEDICINE

## 2024-05-24 NOTE — PROGRESS NOTES
Fausto Forrester  1955 05/24/24    SUBJECTIVE:    Within the last month pt has had bruising on the forearms. He has a new dog and this tends to occur after he is playing with the dog. He denies any blood in the stool, black stools, blood with brushing teeth.    He was diagnosed with Chinle's disease recently.    OBJECTIVE:    /76 (Site: Left Upper Arm, Position: Sitting, Cuff Size: Medium Adult)   Pulse 69   Ht 1.702 m (5' 7\")   Wt 71.9 kg (158 lb 8 oz)   SpO2 99%   BMI 24.82 kg/m²     Physical Exam  Scattered large ecchymoses on arms  ASSESSMENT:    1. Ecchymosis        PLAN:    Fausto was seen today for other.    Diagnoses and all orders for this visit:    Ecchymosis - likely solar purpura. Encourage pt to cover arms. Will check CBC, but likely related to changes in skin due to age and sun exposure, now with trauma from new dog  -     Cancel: CBC; Future

## 2024-08-29 ENCOUNTER — OFFICE VISIT (OUTPATIENT)
Dept: INTERNAL MEDICINE CLINIC | Age: 69
End: 2024-08-29

## 2024-08-29 VITALS
BODY MASS INDEX: 24.48 KG/M2 | OXYGEN SATURATION: 99 % | DIASTOLIC BLOOD PRESSURE: 74 MMHG | HEART RATE: 66 BPM | WEIGHT: 156 LBS | HEIGHT: 67 IN | SYSTOLIC BLOOD PRESSURE: 110 MMHG

## 2024-08-29 DIAGNOSIS — R73.01 IFG (IMPAIRED FASTING GLUCOSE): ICD-10-CM

## 2024-08-29 DIAGNOSIS — R55 PRE-SYNCOPE: Primary | ICD-10-CM

## 2024-08-29 DIAGNOSIS — Z12.5 PROSTATE CANCER SCREENING: ICD-10-CM

## 2024-08-29 DIAGNOSIS — E78.00 HYPERCHOLESTEROLEMIA: ICD-10-CM

## 2024-08-29 DIAGNOSIS — D58.0 SPHEROCYTOSIS, HEREDITARY (HCC): ICD-10-CM

## 2024-08-29 LAB
A/G RATIO: 2.3 RATIO (ref 0.8–2.6)
ALBUMIN: 4.6 G/DL (ref 3.5–5.2)
ALP BLD-CCNC: 81 U/L (ref 23–144)
ALT SERPL-CCNC: 15 U/L (ref 0–60)
AST SERPL-CCNC: 17 U/L (ref 0–55)
BILIRUB SERPL-MCNC: 1.5 MG/DL (ref 0–1.2)
BUN / CREAT RATIO: 17 (ref 7–25)
BUN BLDV-MCNC: 15 MG/DL (ref 3–29)
CALCIUM SERPL-MCNC: 9.3 MG/DL (ref 8.5–10.5)
CHLORIDE BLD-SCNC: 107 MEQ/L (ref 96–110)
CHOLESTEROL, TOTAL: 169 MG/DL
CO2: 24 MEQ/L (ref 19–32)
CREAT SERPL-MCNC: 0.9 MG/DL (ref 0.5–1.2)
ESTIMATED GLOMERULAR FILTRATION RATE CREATININE EQUATION: 94 MLS/MIN/1.73M2
FASTING STATUS: ABNORMAL
GLOBULIN: 2 G/DL (ref 1.9–3.6)
GLUCOSE BLD-MCNC: 94 MG/DL (ref 70–99)
HBA1C MFR BLD: 4.4 %
HCT VFR BLD CALC: 34.8 % (ref 37.5–51)
HDLC SERPL-MCNC: 44 MG/DL
HEMOGLOBIN: 12.1 G/DL (ref 13–17.7)
LDL CHOLESTEROL: 107 MG/DL
MCH RBC QN AUTO: 32.9 PG (ref 26–34)
MCHC RBC AUTO-ENTMCNC: 34.8 G/DL (ref 30.7–35.5)
MCV RBC AUTO: 94.6 FL (ref 80–100)
PDW BLD-RTO: 13.9 %
PLATELET # BLD: 154 K/UL (ref 140–400)
PMV BLD AUTO: 10.1 FL (ref 7.2–11.7)
POTASSIUM SERPL-SCNC: 4.1 MEQ/L (ref 3.4–5.3)
PROSTATE SPECIFIC ANTIGEN: 1.36 NG/ML
RBC # BLD: 3.68 M/UL (ref 4.14–5.8)
SODIUM BLD-SCNC: 143 MEQ/L (ref 135–148)
TOTAL PROTEIN: 6.6 G/DL (ref 6–8.3)
TRIGL SERPL-MCNC: 88 MG/DL
VLDLC SERPL CALC-MCNC: 18 MG/DL (ref 4–38)
WBC # BLD: 4.5 K/UL (ref 3.5–10.9)

## 2024-08-29 RX ORDER — FOLIC ACID 1 MG/1
1000 TABLET ORAL DAILY
Qty: 30 TABLET | Refills: 11 | Status: SHIPPED | OUTPATIENT
Start: 2024-08-29

## 2024-08-29 NOTE — PROGRESS NOTES
Fausto Forrester  1955 08/29/24    SUBJECTIVE:    Pt diagnosed with marshall's disease. He uses fluocinonide with modest relief. Symptoms worse in the summer. The is no itch, flaking.    Pt complains that over the last 18 months he has developed episodes of dizziness. He describes this as pre-syncope. He denies vertigo, syncope, dysequilibrium, CP, SOB, palpitations, HA, N/W, change in vision, blood in the stool, black stools.  When these occur pt will sit, and the episode will resolve in 2 minutes. These occur every couple of weeks. These episodes are triggered by smells - mattson, perfumes, exhaust; one occasion was preceded by emotions.     He continues on allopurinol, has had no episodes of gout.       OBJECTIVE:    /74 (Site: Left Upper Arm, Position: Sitting, Cuff Size: Medium Adult)   Pulse 66   Ht 1.702 m (5' 7\")   Wt 70.8 kg (156 lb)   SpO2 99%   BMI 24.43 kg/m²     Physical Exam  Constitutional:       Appearance: He is well-developed.   Eyes:      General: No scleral icterus.     Conjunctiva/sclera: Conjunctivae normal.   Neck:      Thyroid: No thyromegaly.      Vascular: No JVD.      Trachea: No tracheal deviation.   Cardiovascular:      Rate and Rhythm: Normal rate and regular rhythm.      Heart sounds: Normal heart sounds.   Pulmonary:      Effort: Pulmonary effort is normal. No respiratory distress.      Breath sounds: Normal breath sounds.   Abdominal:      General: There is no distension.      Palpations: Abdomen is soft. There is no mass.      Tenderness: There is no abdominal tenderness. There is no guarding or rebound.   Musculoskeletal:      Cervical back: Neck supple.   Lymphadenopathy:      Cervical: No cervical adenopathy.   Skin:     Nails: There is no clubbing.   Neurological:      Mental Status: He is alert and oriented to person, place, and time.      Comments: Nonfocal   Psychiatric:         Behavior: Behavior normal.         Judgment: Judgment normal.

## 2024-09-13 ENCOUNTER — OFFICE VISIT (OUTPATIENT)
Dept: INTERNAL MEDICINE CLINIC | Age: 69
End: 2024-09-13

## 2024-09-13 ENCOUNTER — TELEPHONE (OUTPATIENT)
Dept: CARDIOLOGY CLINIC | Age: 69
End: 2024-09-13

## 2024-09-13 VITALS
HEART RATE: 64 BPM | OXYGEN SATURATION: 96 % | DIASTOLIC BLOOD PRESSURE: 64 MMHG | WEIGHT: 156.7 LBS | SYSTOLIC BLOOD PRESSURE: 116 MMHG | BODY MASS INDEX: 24.54 KG/M2

## 2024-09-13 DIAGNOSIS — R55 PRE-SYNCOPE: Primary | ICD-10-CM

## 2024-09-16 ENCOUNTER — HOSPITAL ENCOUNTER (OUTPATIENT)
Age: 69
Discharge: HOME | End: 2024-09-16
Payer: MEDICARE

## 2024-09-16 ENCOUNTER — INITIAL CONSULT (OUTPATIENT)
Dept: CARDIOLOGY CLINIC | Age: 69
End: 2024-09-16
Payer: MEDICARE

## 2024-09-16 VITALS
OXYGEN SATURATION: 96 % | SYSTOLIC BLOOD PRESSURE: 128 MMHG | HEIGHT: 67 IN | DIASTOLIC BLOOD PRESSURE: 76 MMHG | WEIGHT: 159.4 LBS | BODY MASS INDEX: 25.02 KG/M2 | HEART RATE: 73 BPM

## 2024-09-16 DIAGNOSIS — I34.0 MODERATE MITRAL REGURGITATION: ICD-10-CM

## 2024-09-16 DIAGNOSIS — R06.02 SOB (SHORTNESS OF BREATH) ON EXERTION: ICD-10-CM

## 2024-09-16 DIAGNOSIS — R55 PRE-SYNCOPE: ICD-10-CM

## 2024-09-16 DIAGNOSIS — I47.10 SVT (SUPRAVENTRICULAR TACHYCARDIA) (HCC): Primary | ICD-10-CM

## 2024-09-16 DIAGNOSIS — I47.10 SVT (SUPRAVENTRICULAR TACHYCARDIA) (HCC): ICD-10-CM

## 2024-09-16 LAB — TSH SERPL DL<=0.05 MIU/L-ACNC: 0.9 UIU/ML (ref 0.27–4.2)

## 2024-09-16 PROCEDURE — 99204 OFFICE O/P NEW MOD 45 MIN: CPT | Performed by: INTERNAL MEDICINE

## 2024-09-16 PROCEDURE — 84443 ASSAY THYROID STIM HORMONE: CPT

## 2024-09-16 PROCEDURE — 1123F ACP DISCUSS/DSCN MKR DOCD: CPT | Performed by: INTERNAL MEDICINE

## 2024-09-16 PROCEDURE — 36415 COLL VENOUS BLD VENIPUNCTURE: CPT

## 2024-10-22 ENCOUNTER — OFFICE VISIT (OUTPATIENT)
Dept: CARDIOLOGY CLINIC | Age: 69
End: 2024-10-22

## 2024-10-22 VITALS
BODY MASS INDEX: 24.48 KG/M2 | HEART RATE: 68 BPM | HEIGHT: 67 IN | SYSTOLIC BLOOD PRESSURE: 124 MMHG | DIASTOLIC BLOOD PRESSURE: 74 MMHG | WEIGHT: 156 LBS

## 2024-10-22 DIAGNOSIS — I47.10 SVT (SUPRAVENTRICULAR TACHYCARDIA) (HCC): ICD-10-CM

## 2024-10-22 DIAGNOSIS — I34.0 MODERATE MITRAL REGURGITATION: Primary | ICD-10-CM

## 2024-10-22 DIAGNOSIS — R55 PRE-SYNCOPE: ICD-10-CM

## 2024-10-22 NOTE — PROGRESS NOTES
CARDIOLOGY  NOTE    10/22/2024    Fausto Forrester (:  1955) is a 69 y.o. male,an established patient with Dr. Govea, here for evaluation of the following chief complaint(s):  Results (Pt denies any cardiac sx,)      SUBJECTIVE/OBJECTIVE:    HPI  Fausto is here to follow up on his cardiovascular health.     He states that he has been doing well. Denies any cardiac complaints    Fausto has a history of gout, mitral regurgitation, pre syncope, Spherocytosis, and SVT.     Fausto is a former smoker. HE denies any issues with obtaining taking or side effects from medications.       Review of Systems   Constitutional:  Negative for fatigue and fever.   Respiratory:  Negative for cough and shortness of breath.    Cardiovascular:  Negative for chest pain, palpitations and leg swelling.   Musculoskeletal:  Negative for arthralgias and gait problem.   Neurological:  Negative for dizziness, syncope, weakness, light-headedness and headaches.       Vitals:    10/22/24 0712   BP: 124/74   Site: Left Upper Arm   Position: Sitting   Cuff Size: Medium Adult   Pulse: 68   Weight: 70.8 kg (156 lb)   Height: 1.702 m (5' 7\")       Wt Readings from Last 3 Encounters:   10/22/24 70.8 kg (156 lb)   24 72.3 kg (159 lb 6.4 oz)   24 71.1 kg (156 lb 11.2 oz)       BP Readings from Last 3 Encounters:   10/22/24 124/74   24 128/76   24 116/64       Prior to Admission medications    Medication Sig Start Date End Date Taking? Authorizing Provider   folic acid (FOLVITE) 1 MG tablet Take 1 tablet by mouth daily 24  Yes Rohan Alexander MD   allopurinol (ZYLOPRIM) 300 MG tablet  7/3/19  Yes ProviderBalaji MD       Physical Exam  Vitals reviewed.   Constitutional:       General: He is not in acute distress.     Appearance: Normal appearance. He is not ill-appearing.   HENT:      Head: Atraumatic.   Neck:      Vascular: No carotid bruit.   Cardiovascular:      Rate and Rhythm: Normal rate and regular

## 2025-03-03 ENCOUNTER — OFFICE VISIT (OUTPATIENT)
Dept: INTERNAL MEDICINE CLINIC | Age: 70
End: 2025-03-03
Payer: MEDICARE

## 2025-03-03 VITALS
WEIGHT: 153.6 LBS | HEART RATE: 69 BPM | SYSTOLIC BLOOD PRESSURE: 126 MMHG | BODY MASS INDEX: 24.06 KG/M2 | OXYGEN SATURATION: 99 % | DIASTOLIC BLOOD PRESSURE: 66 MMHG

## 2025-03-03 DIAGNOSIS — R05.1 ACUTE COUGH: ICD-10-CM

## 2025-03-03 DIAGNOSIS — R41.3 MEMORY DEFICIT: Primary | ICD-10-CM

## 2025-03-03 PROCEDURE — 99214 OFFICE O/P EST MOD 30 MIN: CPT | Performed by: INTERNAL MEDICINE

## 2025-03-03 PROCEDURE — 1159F MED LIST DOCD IN RCRD: CPT | Performed by: INTERNAL MEDICINE

## 2025-03-03 PROCEDURE — G2211 COMPLEX E/M VISIT ADD ON: HCPCS | Performed by: INTERNAL MEDICINE

## 2025-03-03 PROCEDURE — 1123F ACP DISCUSS/DSCN MKR DOCD: CPT | Performed by: INTERNAL MEDICINE

## 2025-03-03 RX ORDER — PREDNISONE 10 MG/1
TABLET ORAL
Qty: 20 TABLET | Refills: 0 | Status: SHIPPED | OUTPATIENT
Start: 2025-03-03

## 2025-03-03 SDOH — ECONOMIC STABILITY: FOOD INSECURITY: WITHIN THE PAST 12 MONTHS, THE FOOD YOU BOUGHT JUST DIDN'T LAST AND YOU DIDN'T HAVE MONEY TO GET MORE.: NEVER TRUE

## 2025-03-03 SDOH — ECONOMIC STABILITY: FOOD INSECURITY: WITHIN THE PAST 12 MONTHS, YOU WORRIED THAT YOUR FOOD WOULD RUN OUT BEFORE YOU GOT MONEY TO BUY MORE.: NEVER TRUE

## 2025-03-03 ASSESSMENT — PATIENT HEALTH QUESTIONNAIRE - PHQ9
SUM OF ALL RESPONSES TO PHQ QUESTIONS 1-9: 0
SUM OF ALL RESPONSES TO PHQ QUESTIONS 1-9: 0
2. FEELING DOWN, DEPRESSED OR HOPELESS: NOT AT ALL
SUM OF ALL RESPONSES TO PHQ QUESTIONS 1-9: 0
1. LITTLE INTEREST OR PLEASURE IN DOING THINGS: NOT AT ALL
SUM OF ALL RESPONSES TO PHQ QUESTIONS 1-9: 0

## 2025-03-03 NOTE — PROGRESS NOTES
Fausto Forrester  1955 03/03/25    SUBJECTIVE:      2.5 weeks ago pt had covid. He has recovered, except he has had a lingering dry cough. He denies rhinorrhea, PND, SOB, wheezing, F/C, HB. He has had mild nasal congestion. He has not treated these symptoms.    Pt has noticed that he has had more difficulties with his memory - for example going into a room forgetting why; keeping his mind organized. He denies any functional deficits. His father had lewy body dementia; sister has some form of dementia.      OBJECTIVE:    /66   Pulse 69   Wt 69.7 kg (153 lb 9.6 oz)   SpO2 99%   BMI 24.06 kg/m²     Physical Exam    ASSESSMENT:    1. Memory deficit    2. Acute cough        PLAN:    Fausto was seen today for cough and post-covid symptoms.    Diagnoses and all orders for this visit:    Memory deficit-I will check a CT scan and B12 and TSH.  Mini-Mental status exam 30 out of 30 today.  Discussed referral to neurology, patient declines for now.  Symptoms are very minimal so we will continue to monitor  -     CT HEAD WO CONTRAST; Future  -     Vitamin B12; Future  -     TSH reflex to FT4; Future    Acute cough I suspect this is related to the patient's COVID.  Treat with prednisone to help decrease bronchial inflammation.  -     predniSONE (DELTASONE) 10 MG tablet; Take 4 tablets daily for 2 days, then 3 tablets daily for 2 days, then two tablets daily for 2 days, then one tablet daily for 2 days

## 2025-03-05 ENCOUNTER — HOSPITAL ENCOUNTER (OUTPATIENT)
Age: 70
Discharge: HOME | End: 2025-03-05
Payer: MEDICARE

## 2025-03-05 DIAGNOSIS — R41.3 MEMORY DEFICIT: ICD-10-CM

## 2025-03-05 LAB
TSH SERPL DL<=0.05 MIU/L-ACNC: 0.45 UIU/ML (ref 0.27–4.2)
VIT B12 SERPL-MCNC: 345 PG/ML (ref 211–911)

## 2025-03-05 PROCEDURE — 82607 VITAMIN B-12: CPT

## 2025-03-05 PROCEDURE — 84443 ASSAY THYROID STIM HORMONE: CPT

## 2025-03-07 ENCOUNTER — HOSPITAL ENCOUNTER (OUTPATIENT)
Dept: CT IMAGING | Age: 70
Discharge: HOME | End: 2025-03-07
Attending: INTERNAL MEDICINE
Payer: MEDICARE

## 2025-03-07 ENCOUNTER — HOSPITAL ENCOUNTER (OUTPATIENT)
Age: 70
End: 2025-03-07
Attending: INTERNAL MEDICINE
Payer: MEDICARE

## 2025-03-07 DIAGNOSIS — R41.3 MEMORY DEFICIT: ICD-10-CM

## 2025-03-07 PROCEDURE — 70450 CT HEAD/BRAIN W/O DYE: CPT

## 2025-03-22 ENCOUNTER — APPOINTMENT (OUTPATIENT)
Dept: CT IMAGING | Age: 70
End: 2025-03-22
Payer: MEDICARE

## 2025-03-22 ENCOUNTER — HOSPITAL ENCOUNTER (EMERGENCY)
Age: 70
Discharge: HOME | End: 2025-03-22
Attending: EMERGENCY MEDICINE
Payer: MEDICARE

## 2025-03-22 VITALS
SYSTOLIC BLOOD PRESSURE: 153 MMHG | HEIGHT: 67 IN | DIASTOLIC BLOOD PRESSURE: 80 MMHG | OXYGEN SATURATION: 98 % | BODY MASS INDEX: 22.76 KG/M2 | HEART RATE: 104 BPM | RESPIRATION RATE: 15 BRPM | TEMPERATURE: 98.4 F | WEIGHT: 145 LBS

## 2025-03-22 DIAGNOSIS — K11.20 SUBMANDIBULAR GLAND INFECTION: Primary | ICD-10-CM

## 2025-03-22 LAB
ANION GAP SERPL CALCULATED.3IONS-SCNC: 12 MMOL/L (ref 9–17)
BASOPHILS # BLD: 0.02 K/UL
BASOPHILS NFR BLD: 0 % (ref 0–1)
BUN SERPL-MCNC: 12 MG/DL (ref 7–20)
CALCIUM SERPL-MCNC: 9.5 MG/DL (ref 8.3–10.6)
CHLORIDE SERPL-SCNC: 105 MMOL/L (ref 99–110)
CO2 SERPL-SCNC: 26 MMOL/L (ref 21–32)
CREAT SERPL-MCNC: 0.8 MG/DL (ref 0.8–1.3)
EOSINOPHIL # BLD: 0.02 K/UL
EOSINOPHILS RELATIVE PERCENT: 0 % (ref 0–3)
ERYTHROCYTE [DISTWIDTH] IN BLOOD BY AUTOMATED COUNT: 15.5 % (ref 11.7–14.9)
GFR, ESTIMATED: >90 ML/MIN/1.73M2
GLUCOSE SERPL-MCNC: 117 MG/DL (ref 74–99)
HCT VFR BLD AUTO: 35.9 % (ref 42–52)
HGB BLD-MCNC: 12.3 G/DL (ref 13.5–18)
IMM GRANULOCYTES # BLD AUTO: 0.02 K/UL
IMM GRANULOCYTES NFR BLD: 0 %
LACTATE BLDV-SCNC: 1 MMOL/L (ref 0.4–2)
LYMPHOCYTES NFR BLD: 0.91 K/UL
LYMPHOCYTES RELATIVE PERCENT: 11 % (ref 24–44)
MCH RBC QN AUTO: 32.9 PG (ref 27–31)
MCHC RBC AUTO-ENTMCNC: 34.3 G/DL (ref 32–36)
MCV RBC AUTO: 96 FL (ref 78–100)
MONOCYTES NFR BLD: 0.53 K/UL
MONOCYTES NFR BLD: 6 % (ref 0–4)
NEUTROPHILS NFR BLD: 82 % (ref 36–66)
NEUTS SEG NFR BLD: 6.86 K/UL
PLATELET # BLD AUTO: 131 K/UL (ref 140–440)
PLATELET CONFIRMATION: NORMAL
PLATELET ESTIMATE: NORMAL
PMV BLD AUTO: 9.3 FL (ref 7.5–11.1)
POTASSIUM SERPL-SCNC: 4 MMOL/L (ref 3.5–5.1)
RBC # BLD AUTO: 3.74 M/UL (ref 4.6–6.2)
RBC # BLD: NORMAL 10*6/UL
SODIUM SERPL-SCNC: 143 MMOL/L (ref 136–145)
WBC # BLD: NORMAL 10*3/UL
WBC OTHER # BLD: 8.4 K/UL (ref 4–10.5)

## 2025-03-22 PROCEDURE — 93005 ELECTROCARDIOGRAM TRACING: CPT | Performed by: EMERGENCY MEDICINE

## 2025-03-22 PROCEDURE — 99285 EMERGENCY DEPT VISIT HI MDM: CPT

## 2025-03-22 PROCEDURE — 80048 BASIC METABOLIC PNL TOTAL CA: CPT

## 2025-03-22 PROCEDURE — 85025 COMPLETE CBC W/AUTO DIFF WBC: CPT

## 2025-03-22 PROCEDURE — 6360000004 HC RX CONTRAST MEDICATION: Performed by: EMERGENCY MEDICINE

## 2025-03-22 PROCEDURE — 6360000002 HC RX W HCPCS: Performed by: EMERGENCY MEDICINE

## 2025-03-22 PROCEDURE — 83605 ASSAY OF LACTIC ACID: CPT

## 2025-03-22 PROCEDURE — 70491 CT SOFT TISSUE NECK W/DYE: CPT

## 2025-03-22 RX ORDER — AMOXICILLIN 500 MG/1
500 CAPSULE ORAL 4 TIMES DAILY
COMMUNITY
Start: 2025-03-21 | End: 2025-03-22 | Stop reason: ALTCHOICE

## 2025-03-22 RX ORDER — IOPAMIDOL 755 MG/ML
75 INJECTION, SOLUTION INTRAVASCULAR
Status: COMPLETED | OUTPATIENT
Start: 2025-03-22 | End: 2025-03-22

## 2025-03-22 RX ORDER — DEXAMETHASONE 4 MG/1
10 TABLET ORAL ONCE
Status: COMPLETED | OUTPATIENT
Start: 2025-03-22 | End: 2025-03-22

## 2025-03-22 RX ADMIN — IOPAMIDOL 75 ML: 755 INJECTION, SOLUTION INTRAVENOUS at 12:01

## 2025-03-22 RX ADMIN — DEXAMETHASONE 10 MG: 4 TABLET ORAL at 12:53

## 2025-03-22 ASSESSMENT — PAIN DESCRIPTION - ORIENTATION: ORIENTATION: RIGHT

## 2025-03-22 ASSESSMENT — PAIN DESCRIPTION - LOCATION: LOCATION: THROAT

## 2025-03-22 ASSESSMENT — PAIN DESCRIPTION - PAIN TYPE: TYPE: ACUTE PAIN

## 2025-03-22 ASSESSMENT — PAIN - FUNCTIONAL ASSESSMENT
PAIN_FUNCTIONAL_ASSESSMENT: ACTIVITIES ARE NOT PREVENTED
PAIN_FUNCTIONAL_ASSESSMENT: 0-10

## 2025-03-22 ASSESSMENT — PAIN DESCRIPTION - DESCRIPTORS: DESCRIPTORS: ACHING

## 2025-03-22 ASSESSMENT — PAIN SCALES - GENERAL: PAINLEVEL_OUTOF10: 6

## 2025-03-22 ASSESSMENT — PAIN DESCRIPTION - FREQUENCY: FREQUENCY: INTERMITTENT

## 2025-03-22 NOTE — ED PROVIDER NOTES
Emergency Department Encounter  Location: [facility] EMERGENCY DEPARTMENT    Patient: Fausto Forrester  MRN: [id number]  : 1955  Date of evaluation: 3/22/2025  ED Provider: Lacey Hannah DO    Chief Complaint:    sore throat (Reports of having dental work done 2 weeks ago reports of going back to the dentist yesterday and was started on Amoxicillin. Reports that his throat is swollen.)    [place]:  Fausto Forrester is a 69 y.o. male that presents to the emergency department with concern for sore throat.  States that he is able to swallow but his throat feels swollen.  Has not noticed voice changes.  No measured fever or chills.  Patient does have a history of recent dental procedures, with an extraction on 3/3 and filling placed 3/18.  He was seen again yesterday for adjustment of the filling.  At that time was also started on Amoxil.      Past Medical History:   Diagnosis Date    Cancer (HCC) 3/10/2022    Invasive Squamous Cell Carcinoma, Well Differentiated    Colon polyps     Family history of coronary artery disease     Gout     Jorje's disease     Palpitations      TTE - mod TR,  EF 55%;  holter WNL    Spherocytosis, hereditary      Past Surgical History:   Procedure Laterality Date    APPENDECTOMY      COLONOSCOPY      Dr Chandra - screening and family history    COLONOSCOPY  14    Dr Chandra-screening    OTHER SURGICAL HISTORY      capsule endoscopy- Dr Chandra    SKIN CANCER EXCISION      basal cell ca chest    TONSILLECTOMY  1966    UPPER GASTROINTESTINAL ENDOSCOPY      Dr Chandra     Family History   Problem Relation Age of Onset    Cancer Mother         Pancreatic    Other Father         Diffuse Lewy body Syndrome    Cancer Sister         Breast     Social History     Socioeconomic History    Marital status:      Spouse name: Not on file    Number of children: Not on file    Years of education: Not on file    Highest education level: Not on file

## 2025-03-22 NOTE — ED NOTES
The patient presents to the er today with complaints of a swollen throat. He reports of having some dental work done 2 weeks ago and had some complications. He reports of going back to the dentist yesterday and was started on Amoxicillin. The call light is within reach and family are at the bedside.

## 2025-03-23 LAB
EKG ATRIAL RATE: 92 BPM
EKG DIAGNOSIS: NORMAL
EKG P AXIS: 23 DEGREES
EKG P-R INTERVAL: 118 MS
EKG Q-T INTERVAL: 368 MS
EKG QRS DURATION: 126 MS
EKG QTC CALCULATION (BAZETT): 455 MS
EKG R AXIS: 54 DEGREES
EKG T AXIS: 24 DEGREES
EKG VENTRICULAR RATE: 92 BPM

## 2025-03-23 PROCEDURE — 93010 ELECTROCARDIOGRAM REPORT: CPT | Performed by: INTERNAL MEDICINE

## 2025-03-24 ENCOUNTER — OFFICE VISIT (OUTPATIENT)
Dept: INTERNAL MEDICINE CLINIC | Age: 70
End: 2025-03-24
Payer: MEDICARE

## 2025-03-24 VITALS
OXYGEN SATURATION: 99 % | BODY MASS INDEX: 23.34 KG/M2 | HEART RATE: 78 BPM | WEIGHT: 149 LBS | DIASTOLIC BLOOD PRESSURE: 74 MMHG | SYSTOLIC BLOOD PRESSURE: 132 MMHG

## 2025-03-24 DIAGNOSIS — K11.20 SUBMANDIBULAR GLAND INFECTION: Primary | ICD-10-CM

## 2025-03-24 PROCEDURE — 99214 OFFICE O/P EST MOD 30 MIN: CPT | Performed by: INTERNAL MEDICINE

## 2025-03-24 PROCEDURE — 1123F ACP DISCUSS/DSCN MKR DOCD: CPT | Performed by: INTERNAL MEDICINE

## 2025-03-24 PROCEDURE — 1159F MED LIST DOCD IN RCRD: CPT | Performed by: INTERNAL MEDICINE

## 2025-03-24 PROCEDURE — G2211 COMPLEX E/M VISIT ADD ON: HCPCS | Performed by: INTERNAL MEDICINE

## 2025-03-24 NOTE — PROGRESS NOTES
Fausto Forrester  1955 03/24/25    SUBJECTIVE:    Earlier in the month pt was in his USOH when he went to the dentist to have a tooth resection.  He then had a filling placed in the back left molar 3/18. On 3/21 pt developed sore throat, pain with swallowing, and swelling on the right lower face - he was started on amoxicillin and the filing was adjusted. Symptoms worsened so pt went to the ER. He denies any F/C.     In the ER pt had a CT that showed asymmetric enlargement of the right submandibular gland with fat stranding and edema, LAD.     He was started on augmentin,and he very quickly has had significant improvement.     OBJECTIVE:    /74   Pulse 78   Wt 67.6 kg (149 lb)   SpO2 99%   BMI 23.34 kg/m²     Physical Exam  Mild tenderness of right submandibular gland  No swelling, erythema of right lower gum  No lymphadenopathy  ASSESSMENT:    1. Submandibular gland infection        PLAN:    Fausto was seen today for ed follow-up.    Diagnoses and all orders for this visit:    Submandibular gland infection-this likely was a bacterial infection.  Reviewed records from the emergency room.  Possibly this was secondary to an obstruction that resolved but seems more likely to have simply been a primary infection.  Continue Augmentin.  If this recurs I will restart Augmentin and refer to ENT.

## 2025-07-17 ENCOUNTER — OFFICE VISIT (OUTPATIENT)
Dept: INTERNAL MEDICINE CLINIC | Age: 70
End: 2025-07-17

## 2025-07-17 VITALS
HEART RATE: 64 BPM | SYSTOLIC BLOOD PRESSURE: 120 MMHG | DIASTOLIC BLOOD PRESSURE: 78 MMHG | BODY MASS INDEX: 24.09 KG/M2 | WEIGHT: 153.8 LBS | OXYGEN SATURATION: 99 %

## 2025-07-17 DIAGNOSIS — H61.22 IMPACTED CERUMEN OF LEFT EAR: ICD-10-CM

## 2025-07-17 DIAGNOSIS — H91.92 DECREASED HEARING OF LEFT EAR: Primary | ICD-10-CM

## 2025-07-17 NOTE — PROGRESS NOTES
Fausto Forrester  1955 07/17/25    SUBJECTIVE:      Pt with a clogged left ear. In the past he has used ear drop and a syringe to clear out wax, but now unable to do so. This stared 2 days ago.     He denies any pain, discharge from the ear.     OBJECTIVE:    /78 (BP Site: Left Upper Arm, Patient Position: Sitting, BP Cuff Size: Medium Adult)   Pulse 64   Wt 69.8 kg (153 lb 12.8 oz)   SpO2 99%   BMI 24.09 kg/m²     Physical Exam  Bilateral cerumen impaction  ASSESSMENT:    1. Decreased hearing of left ear    2. Impacted cerumen of left ear        PLAN:    Fausto was seen today for ear fullness.    Diagnoses and all orders for this visit:    Decreased hearing of left ear - likely from the cerumen impaction. Will disimpact today    Impacted cerumen of left ear  -     REMOVE IMPACTED EAR WAX  Patient with a cerumen impaction. External auditory canal cleaned with water under pressure, then cerumen disimpacted with cuvette. Patient tolerated the procedure well.

## 2025-08-29 ENCOUNTER — TELEPHONE (OUTPATIENT)
Dept: CARDIOLOGY CLINIC | Age: 70
End: 2025-08-29